# Patient Record
Sex: FEMALE | Race: WHITE
[De-identification: names, ages, dates, MRNs, and addresses within clinical notes are randomized per-mention and may not be internally consistent; named-entity substitution may affect disease eponyms.]

---

## 2018-02-10 ENCOUNTER — HOSPITAL ENCOUNTER (EMERGENCY)
Dept: HOSPITAL 8 - ED | Age: 33
Discharge: HOME | End: 2018-02-10
Payer: MEDICAID

## 2018-02-10 VITALS — SYSTOLIC BLOOD PRESSURE: 129 MMHG | DIASTOLIC BLOOD PRESSURE: 90 MMHG

## 2018-02-10 VITALS — WEIGHT: 175.93 LBS | HEIGHT: 64 IN | BODY MASS INDEX: 30.04 KG/M2
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
## 2018-02-10 DIAGNOSIS — S33.5XXA: Primary | ICD-10-CM

## 2018-02-10 DIAGNOSIS — F17.200: ICD-10-CM
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
## 2018-02-10 DIAGNOSIS — M19.90: ICD-10-CM
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
## 2018-02-10 PROCEDURE — 96372 THER/PROPH/DIAG INJ SC/IM: CPT

## 2018-02-10 PROCEDURE — 99284 EMERGENCY DEPT VISIT MOD MDM: CPT

## 2018-02-10 PROCEDURE — 72110 X-RAY EXAM L-2 SPINE 4/>VWS: CPT

## 2018-04-18 ENCOUNTER — HOSPITAL ENCOUNTER (EMERGENCY)
Dept: HOSPITAL 8 - ED | Age: 33
Discharge: HOME | End: 2018-04-18
Payer: MEDICAID

## 2018-04-18 VITALS — DIASTOLIC BLOOD PRESSURE: 85 MMHG | SYSTOLIC BLOOD PRESSURE: 132 MMHG

## 2018-04-18 VITALS — BODY MASS INDEX: 29.6 KG/M2 | WEIGHT: 173.37 LBS | HEIGHT: 64 IN

## 2018-04-18 DIAGNOSIS — M19.90: ICD-10-CM

## 2018-04-18 DIAGNOSIS — M25.571: Primary | ICD-10-CM

## 2018-04-18 PROCEDURE — 99284 EMERGENCY DEPT VISIT MOD MDM: CPT

## 2018-11-02 ENCOUNTER — HOSPITAL ENCOUNTER (EMERGENCY)
Dept: HOSPITAL 8 - ED | Age: 33
Discharge: HOME | End: 2018-11-02
Payer: MEDICAID

## 2018-11-02 VITALS — SYSTOLIC BLOOD PRESSURE: 126 MMHG | DIASTOLIC BLOOD PRESSURE: 76 MMHG

## 2018-11-02 VITALS — WEIGHT: 177.03 LBS | HEIGHT: 64 IN | BODY MASS INDEX: 30.22 KG/M2

## 2018-11-02 DIAGNOSIS — R51: Primary | ICD-10-CM

## 2018-11-02 DIAGNOSIS — R11.0: ICD-10-CM

## 2018-11-02 LAB
ALBUMIN SERPL-MCNC: 4.4 G/DL (ref 3.4–5)
ALP SERPL-CCNC: 68 U/L (ref 45–117)
ALT SERPL-CCNC: 28 U/L (ref 12–78)
ANION GAP SERPL CALC-SCNC: 8 MMOL/L (ref 5–15)
BASOPHILS # BLD AUTO: 0.02 X10^3/UL (ref 0–0.1)
BASOPHILS NFR BLD AUTO: 0 % (ref 0–1)
BILIRUB SERPL-MCNC: 0.4 MG/DL (ref 0.2–1)
CALCIUM SERPL-MCNC: 9.1 MG/DL (ref 8.5–10.1)
CHLORIDE SERPL-SCNC: 106 MMOL/L (ref 98–107)
CREAT SERPL-MCNC: 1.02 MG/DL (ref 0.55–1.02)
EOSINOPHIL # BLD AUTO: 0.12 X10^3/UL (ref 0–0.4)
EOSINOPHIL NFR BLD AUTO: 1 % (ref 1–7)
ERYTHROCYTE [DISTWIDTH] IN BLOOD BY AUTOMATED COUNT: 12.9 % (ref 9.6–15.2)
LYMPHOCYTES # BLD AUTO: 2.05 X10^3/UL (ref 1–3.4)
LYMPHOCYTES NFR BLD AUTO: 19 % (ref 22–44)
MCH RBC QN AUTO: 31.5 PG (ref 27–34.8)
MCHC RBC AUTO-ENTMCNC: 34.3 G/DL (ref 32.4–35.8)
MCV RBC AUTO: 92 FL (ref 80–100)
MD: NO
MONOCYTES # BLD AUTO: 0.55 X10^3/UL (ref 0.2–0.8)
MONOCYTES NFR BLD AUTO: 5 % (ref 2–9)
NEUTROPHILS # BLD AUTO: 7.84 X10^3/UL (ref 1.8–6.8)
NEUTROPHILS NFR BLD AUTO: 74 % (ref 42–75)
PLATELET # BLD AUTO: 304 X10^3/UL (ref 130–400)
PMV BLD AUTO: 8.5 FL (ref 7.4–10.4)
PROT SERPL-MCNC: 8.5 G/DL (ref 6.4–8.2)
RBC # BLD AUTO: 5.05 X10^6/UL (ref 3.82–5.3)

## 2018-11-02 PROCEDURE — 96374 THER/PROPH/DIAG INJ IV PUSH: CPT

## 2018-11-02 PROCEDURE — 96375 TX/PRO/DX INJ NEW DRUG ADDON: CPT

## 2018-11-02 PROCEDURE — 36415 COLL VENOUS BLD VENIPUNCTURE: CPT

## 2018-11-02 PROCEDURE — 85025 COMPLETE CBC W/AUTO DIFF WBC: CPT

## 2018-11-02 PROCEDURE — 70450 CT HEAD/BRAIN W/O DYE: CPT

## 2018-11-02 PROCEDURE — 99285 EMERGENCY DEPT VISIT HI MDM: CPT

## 2018-11-02 PROCEDURE — 80053 COMPREHEN METABOLIC PANEL: CPT

## 2019-01-22 ENCOUNTER — OFFICE VISIT (OUTPATIENT)
Dept: NEUROLOGY | Facility: MEDICAL CENTER | Age: 34
End: 2019-01-22
Payer: MEDICAID

## 2019-01-22 VITALS
HEART RATE: 77 BPM | DIASTOLIC BLOOD PRESSURE: 64 MMHG | WEIGHT: 174.4 LBS | HEIGHT: 64 IN | OXYGEN SATURATION: 96 % | RESPIRATION RATE: 16 BRPM | TEMPERATURE: 97.2 F | BODY MASS INDEX: 29.78 KG/M2 | SYSTOLIC BLOOD PRESSURE: 120 MMHG

## 2019-01-22 DIAGNOSIS — R29.818 OTHER SYMPTOMS AND SIGNS INVOLVING THE NERVOUS SYSTEM: ICD-10-CM

## 2019-01-22 DIAGNOSIS — G40.909 SEIZURE CEREBRAL (HCC): ICD-10-CM

## 2019-01-22 DIAGNOSIS — E53.8 B12 DEFICIENCY: ICD-10-CM

## 2019-01-22 DIAGNOSIS — R42 SPELL OF DIZZINESS: ICD-10-CM

## 2019-01-22 PROCEDURE — 99204 OFFICE O/P NEW MOD 45 MIN: CPT | Performed by: PSYCHIATRY & NEUROLOGY

## 2019-01-22 ASSESSMENT — PATIENT HEALTH QUESTIONNAIRE - PHQ9: CLINICAL INTERPRETATION OF PHQ2 SCORE: 0

## 2019-01-22 NOTE — PROGRESS NOTES
NEUROLOGY NOTE    Referring Physician  Marilyn Rod M.D.      CHIEF COMPLAINT:  Started having intermittent weakness and numbness over limbs since 2009  Especially when crossing legs, the leg could become weak and numb --- took minutes to hours to recover  Also pain over her limb  Chief Complaint   Patient presents with   • Establish Care     Nerve problems       PRESENT ILLNESS:   Started having weakness and numbness over limbs since 2009  Especially when crossing legs, the leg could become weak and numb --- took minutes to hours to recover  Also pain over her limb    The patient mentioned this issue to her former physicians  Knee problems were mentioned,  Blood tests,     At times, during supine position, the patient felt hot and cold feeling in the back    The patient started having anxiety since age of 16YO-- tried zoloft,   Also panic attack     PAST MEDICAL HISTORY:  No past medical history on file.    PAST SURGICAL HISTORY:  Past Surgical History:   Procedure Laterality Date   • ALIY[place] BY LAPAROSCOPY  3/11/2011    Performed by NATHAN BRAN at SURGERY [facility]       FAMILY HISTORY:  Family History   Problem Relation Age of Onset   • Alcohol/Drug Father         alcohol   • Diabetes Maternal Grandmother         NIDDM   • Heart Disease Maternal Grandmother        SOCIAL HISTORY:  Social History     Social History   • Marital status: Single     Spouse name: N/A   • Number of children: N/A   • Years of education: N/A     Occupational History   • Not on file.     Social History Main Topics   • Smoking status: Current Every Day Smoker   • Smokeless tobacco: Not on file      Comment: started 2 days ago   • Alcohol use No      Comment: before pregnancy   • Drug use: No   • Sexual activity: Yes     Partners: Male     Other Topics Concern   • Not on file     Social History Narrative   • No narrative on file     ALLERGIES:  No Known Allergies  TOBHX  History   Smoking Status   • Current Every Day Smoker  "  Smokeless Tobacco   • Not on file     Comment: started 2 days ago     ALCHX  History   Alcohol Use No     Comment: before pregnancy     DRUGHX  History   Drug Use No           MEDICATIONS:  Current Outpatient Prescriptions   Medication   • docusate sodium (COLACE) 100 MG CAPS   • ibuprofen (MOTRIN) 600 MG TABS   • oxycodone-acetaminophen (PERCOCET) 5-325 MG TABS   • Prenatal Vit w/Kf-Ckruipunh-YD (PNV PO)     No current facility-administered medications for this visit.        REVIEW OF SYSTEM:    Constitutional: Denies fevers, Denies weight changes   Eyes: Denies changes in vision, no eye pain   Ears/Nose/Throat/Mouth: Denies nasal congestion or sore throat   Cardiovascular: Denies chest pain or palpitations   Respiratory: Denies SOB.   Gastrointestinal/Hepatic: Denies abdominal pain, nausea, vomiting, diarrhea, constipation or GI bleeding   Genitourinary: Denies bladder dysfunction, dysuria or frequency   Musculoskeletal/Rheum: Denies joint pain and swelling   Skin/Breast: Denies rash, denies breast lumps or discharge   Neurological:  Limbs buckling, weakness and numbness  Psychiatric: anxiety and panic attack-- tried zoloft and celexa  Endocrine: denies hx of diabetes or thyroid dysfunction   Heme/Oncology/Lymph Nodes: Denies enlarged lymph nodes, denies brusing or known bleeding disorder   Allergic/Immunologic: Denies hx of allergies         PHYSICAL AND NEUROLOGICAL EXMAINATIONS:  VITAL SIGNS: /64 (BP Location: Right arm, Patient Position: Sitting, BP Cuff Size: Adult)   Pulse 77   Temp 36.2 °C (97.2 °F) (Temporal)   Resp 16   Ht 1.626 m (5' 4\")   Wt 79.1 kg (174 lb 6.4 oz)   SpO2 96%   BMI 29.94 kg/m²   CURRENT WEIGHT:   BMI: Body mass index is 29.94 kg/m².  PREVIOUS WEIGHTS:  Wt Readings from Last 25 Encounters:   01/22/19 79.1 kg (174 lb 6.4 oz)   03/11/11 68 kg (150 lb)   02/15/11 71 kg (156 lb 8.4 oz)   02/07/11 79.4 kg (175 lb)   08/19/10 61.7 kg (136 lb)   07/22/10 59 kg (130 lb) "       General appearance of patient: WDWN(+) NAD(+)    EYES  o Fundus : Papilledem(-) Exudates(-) Hemorrhage(-)  Nervous System  Orientation to time, place and person(+)  Memory normal(+)  Language: aphasia(-)  Knowledge: past(+) Current(+)  Attention(+)  Cranial Nerves  • Nerve 2: intact  • Nerve 3,4,6: intact  • Nerve 5 : intact  • Nerve 7: intact  • Nerve 8: intact  • Nerve 9 & 10: intact  • Nerve 11: intact  • Nerve 12: intact  Muscle Power and muscle tone: symmetric in upper and lower  Sensory System: Pin sensation intact(+)  Reflexes: symmetric throughout  Cerebellar Function FNP normal   Gait : Steady(+) TandemGait steady(+)  Heart and Vascular  Peripheral Vasucular system : Edema (-) Swelling(-)  RHB, Breathing sound clear  abdomen bowel sound normoactive  Extremities freely moveable  Joints no contracture       NEUROIMAGING:       LAB:         Her daughter has vertigo and migraine started at the age of 1YO  Sister was diagnosed as arnold chiari malformation        IMPRESSION:    1. Spells of limb tingling, weakness -- triggered by crossing legs, the weakness could last for minutes to hours since 2009-- on daily basis  2. Anxiety and Panic since age of 16YO  3. Cognitive decline since 2014    PLAN/RECOMMENDATIONS:    We will offer MRI of brain. Blood tests and EEG of brain regarding these spells of limbs tingling, weakness      ________________________________________________________________________        Regarding the spells of limb tingling -- the following are possible hypthesis    A. Migraine variants-- similar to hemiplegia migraine  B. Subtle seizures  C. TIA-- small strokes ( unlikely)  D. Psychogenic ( unlikely )    We will get MRI of Brain, EEG and blood tests    ________________________________________________________________________        Could contact us one week after these tests  We might start therapeutic trial of treatment      SIGNATURE:  Crissy De La Torre    CC:  Marilyn Rod M.D.

## 2019-01-22 NOTE — PATIENT INSTRUCTIONS
IMPRESSION:    1. Spells of limb tingling, weakness -- triggered by crossing legs, the weakness could last for minutes to hours since 2009-- on daily basis  2. Anxiety and Panic since age of 14YO  3. Cognitive decline since 2014    PLAN/RECOMMENDATIONS:    We will offer MRI of brain. Blood tests and EEG of brain regarding these spells of limbs tingling, weakness      ________________________________________________________________________        Regarding the spells of limb tingling -- the following are possible hypthesis    A. Migraine variants-- similar to hemiplegia migraine  B. Subtle seizures  C. TIA-- small strokes ( unlikely)  D. Psychogenic ( unlikely )    We will get MRI of Brain, EEG and blood tests    ________________________________________________________________________    Could contact us one week after these tests  We might start therapeutic trial of treatment        SIGNATURE:  Crissy De La Torre    CC:  Marilyn Rod M.D.

## 2019-01-31 ENCOUNTER — HOSPITAL ENCOUNTER (OUTPATIENT)
Dept: RADIOLOGY | Facility: MEDICAL CENTER | Age: 34
End: 2019-01-31
Attending: PSYCHIATRY & NEUROLOGY
Payer: MEDICAID

## 2019-01-31 DIAGNOSIS — R29.818 OTHER SYMPTOMS AND SIGNS INVOLVING THE NERVOUS SYSTEM: ICD-10-CM

## 2019-01-31 PROCEDURE — 70551 MRI BRAIN STEM W/O DYE: CPT

## 2019-02-05 ENCOUNTER — TELEPHONE (OUTPATIENT)
Dept: NEUROLOGY | Facility: MEDICAL CENTER | Age: 34
End: 2019-02-05

## 2019-07-30 ENCOUNTER — HOSPITAL ENCOUNTER (EMERGENCY)
Dept: HOSPITAL 8 - ED | Age: 34
Discharge: HOME | End: 2019-07-30
Payer: MEDICAID

## 2019-07-30 VITALS — SYSTOLIC BLOOD PRESSURE: 164 MMHG | DIASTOLIC BLOOD PRESSURE: 94 MMHG

## 2019-07-30 VITALS — WEIGHT: 158.51 LBS | BODY MASS INDEX: 28.09 KG/M2 | HEIGHT: 63 IN
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
## 2019-07-30 DIAGNOSIS — S80.862A: Primary | ICD-10-CM
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
## 2019-07-30 DIAGNOSIS — M19.90: ICD-10-CM

## 2019-07-30 PROCEDURE — 90715 TDAP VACCINE 7 YRS/> IM: CPT

## 2019-07-30 PROCEDURE — 90471 IMMUNIZATION ADMIN: CPT

## 2019-11-13 ENCOUNTER — HOSPITAL ENCOUNTER (EMERGENCY)
Dept: HOSPITAL 8 - ED | Age: 34
Discharge: HOME | End: 2019-11-13
Payer: SELF-PAY

## 2019-11-13 VITALS — WEIGHT: 157.85 LBS | BODY MASS INDEX: 27.97 KG/M2 | HEIGHT: 63 IN

## 2019-11-13 VITALS — SYSTOLIC BLOOD PRESSURE: 147 MMHG | DIASTOLIC BLOOD PRESSURE: 87 MMHG

## 2019-11-13 DIAGNOSIS — M19.90: ICD-10-CM

## 2019-11-13 DIAGNOSIS — R07.89: ICD-10-CM

## 2019-11-13 DIAGNOSIS — R50.9: ICD-10-CM

## 2019-11-13 DIAGNOSIS — J00: Primary | ICD-10-CM

## 2019-11-13 DIAGNOSIS — F17.200: ICD-10-CM

## 2019-11-13 PROCEDURE — 71046 X-RAY EXAM CHEST 2 VIEWS: CPT

## 2019-11-13 PROCEDURE — 99283 EMERGENCY DEPT VISIT LOW MDM: CPT

## 2019-11-15 ENCOUNTER — HOSPITAL ENCOUNTER (EMERGENCY)
Facility: MEDICAL CENTER | Age: 34
End: 2019-11-15
Attending: EMERGENCY MEDICINE
Payer: MEDICAID

## 2019-11-15 ENCOUNTER — APPOINTMENT (OUTPATIENT)
Dept: RADIOLOGY | Facility: MEDICAL CENTER | Age: 34
End: 2019-11-15
Attending: EMERGENCY MEDICINE
Payer: MEDICAID

## 2019-11-15 VITALS
RESPIRATION RATE: 18 BRPM | DIASTOLIC BLOOD PRESSURE: 86 MMHG | WEIGHT: 160.94 LBS | SYSTOLIC BLOOD PRESSURE: 132 MMHG | HEART RATE: 75 BPM | OXYGEN SATURATION: 97 % | HEIGHT: 64 IN | BODY MASS INDEX: 27.48 KG/M2 | TEMPERATURE: 98.3 F

## 2019-11-15 DIAGNOSIS — R05.9 COUGH: ICD-10-CM

## 2019-11-15 LAB — EKG IMPRESSION: NORMAL

## 2019-11-15 PROCEDURE — 93005 ELECTROCARDIOGRAM TRACING: CPT

## 2019-11-15 PROCEDURE — 93005 ELECTROCARDIOGRAM TRACING: CPT | Performed by: EMERGENCY MEDICINE

## 2019-11-15 PROCEDURE — 71046 X-RAY EXAM CHEST 2 VIEWS: CPT

## 2019-11-15 PROCEDURE — 99283 EMERGENCY DEPT VISIT LOW MDM: CPT

## 2019-11-15 SDOH — HEALTH STABILITY: MENTAL HEALTH: HOW OFTEN DO YOU HAVE A DRINK CONTAINING ALCOHOL?: 2-4 TIMES A MONTH

## 2019-11-15 NOTE — ED TRIAGE NOTES
"Val Vaca  Chief Complaint   Patient presents with   • Congestion   • Cough     productive x 3 days   • Shortness of Breath     Pt ambulatory to triage with above complaint.  VSS, mask placed on pt.  Pt states was seen at [facility] 2 days ago, and feels worse.  Pt also c/o \"deep\" pain (L) side of chest.    Pt taken for EKG, then returned to [facility], educated on triage process, and to inform staff of any changes or concerns.     "

## 2019-11-15 NOTE — ED PROVIDER NOTES
"ED Provider Note    Scribed for Eusebia Coelho M.D. by Elisabet Lara. 11/15/2019, 1:40 PM.    Primary care provider: Marilyn Rod M.D.  Means of arrival: Walk-in   History obtained from: Patient  History limited by: None    CHIEF COMPLAINT  Chief Complaint   Patient presents with   • Congestion   • Cough     productive x 3 days   • Shortness of Breath       HPI  Val Vaca is a 34 y.o. female who presents to the Emergency Department for evaluation of cough with green sputum onset 3 days ago. Patient states that she has shortness of breath and describes her breath sounds as \"crackling with a whistle noise.\" She reports going to the ED at [facility] for an evaluation, where she was diagnosed with atelectasis. After the visit, patient states that she has been feeling worse which prompted her to come to the ED for a second opinion. Patient reports of nasal congestion, dyspnea, emesis and pain under her left breast that started today. Patient denies any ear pain or rhinorrhea. She reports that she has been around people that are sick. Patient states that she has four children and wants to get better as soon as possible to prevent her children from getting sick.     REVIEW OF SYSTEMS  Pertinent positives include congestion, cough with green sputum, shortness of breath, dyspnea, emesis and pain under her left breast. Pertinent negatives include no ear pain or rhinorrhea.      PAST MEDICAL HISTORY   None noted.     SURGICAL HISTORY   has a past surgical history that includes [place] by laparoscopy (3/11/2011).    SOCIAL HISTORY  Social History     Tobacco Use   • Smoking status: Current Every Day Smoker     Packs/day: 0.00   • Tobacco comment: started 2 days ago   Substance Use Topics   • Alcohol use: Yes     Frequency: 2-4 times a month     Comment: before pregnancy   • Drug use: No      Social History     Substance and Sexual Activity   Drug Use No       FAMILY HISTORY  Family History   Problem Relation Age of Onset " "  • Alcohol/Drug Father         alcohol   • Diabetes Maternal Grandmother         NIDDM   • Heart Disease Maternal Grandmother      CURRENT MEDICATIONS  Home Medications     Reviewed by Italia Braga R.N. (Registered Nurse) on 11/15/19 at 1258  Med List Status: Partial   Medication Last Dose Status        Patient Mark Taking any Medications                     ALLERGIES  No Known Allergies    PHYSICAL EXAM  VITAL SIGNS: /86   Pulse 75   Temp 36.8 °C (98.3 °F) (Temporal)   Resp 18   Ht 1.626 m (5' 4\")   Wt 73 kg (160 lb 15 oz)   SpO2 97%   BMI 27.62 kg/m²   Constitutional: Alert in no apparent distress. Well apearing  HENT: Normocephalic, Atraumatic, Bilateral external ears normal. TMs normal bilaterally. Nose normal. Posterior oropharynx slightly erythematous.   Eyes:  Conjunctiva normal, non-icteric.   Lungs: Lungs are clear. Non-labored respirations  Skin: Warm, Dry, No erythema, No rash.   Neurologic: Alert, Grossly non-focal.   Psychiatric: Affect normal, Judgment normal, Mood normal, Appears appropriate and not intoxicated.     EKG  12 Lead EKG interpreted by me to show:  Normal sinus rhythm  Rate 66  Axis: Normal  Intervals: Normal  Normal T waves  Normal ST segments  My impression of this EKG: Does not indicate ischemia or arrythmia at this time.    RADIOLOGY  DX-CHEST-2 VIEWS   Final Result         1. No active cardiopulmonary abnormalities are identified.        The radiologist's interpretation of all radiological studies have been reviewed by me.    COURSE & MEDICAL DECISION MAKING  Pertinent Labs & Imaging studies reviewed. (See chart for details)    1:40 PM - Patient seen and examined at bedside. Ordered DX-chest to evaluate her symptoms.     2:20 PM - Patient was reevaluated at bedside. Discussed radiology results with the patient and informed them that the results came back normal.  I think her symptoms are likely viral in nature and will clear on their own.  X-ray was done once " again that does not show any evidence of pneumonia.  I informed the patient that it would take some time for her symptoms to improve, and the best thing to do at this time would be to rest.        The patient will return for new or worsening symptoms and is stable at the time of discharge. Patient was given return precautions. Patient and/or family member verbalizes understanding and will comply.    DISPOSITION:  Patient will be discharged home in stable condition.    FOLLOW UP:  Marilyn Rod M.D.  [address] [facility] [address] [phone]      As needed    [facility], Emergency Dept  [address] [facility] [address] [phone]    If symptoms worsen difficulty breathing or other concerns    FINAL IMPRESSION  1. Cough         This dictation has been created using voice recognition software and/or scribes. The accuracy of the dictation is limited by the abilities of the software and the expertise of the scribes. I expect there may be some errors of grammar and possibly content. I made every attempt to manually correct the errors within my dictation. However, errors related to voice recognition software and/or scribes may still exist and should be interpreted within the appropriate context.     I, Elisabet Lara (Scribe), am scribing for, and in the presence of, Eusebia Coelho M.D..    Electronically signed by: Elisabet Lara (Scribe), 11/15/2019    Eusebia HOOKER M.D. personally performed the services described in this documentation, as scribed by Elisabet Lara in my presence, and it is both accurate and complete.E.    The note accurately reflects work and decisions made by me.  Eusebia Coelho  11/15/2019  4:54 PM

## 2019-11-15 NOTE — ED NOTES
Pt discharge to home.  Pt provided with discharge instruction.  Pt verbalized understanding, all questions answered.  VSS upon DC. Pt steady on feet upon DC.

## 2020-12-03 ENCOUNTER — HOSPITAL ENCOUNTER (OUTPATIENT)
Dept: RADIOLOGY | Facility: MEDICAL CENTER | Age: 35
End: 2020-12-03
Attending: FAMILY MEDICINE
Payer: MEDICAID

## 2020-12-03 DIAGNOSIS — R10.31 RLQ ABDOMINAL PAIN: ICD-10-CM

## 2021-03-14 ENCOUNTER — HOSPITAL ENCOUNTER (EMERGENCY)
Dept: HOSPITAL 8 - ED | Age: 36
Discharge: HOME | End: 2021-03-14
Payer: MEDICAID

## 2021-03-14 VITALS — BODY MASS INDEX: 33.67 KG/M2 | HEIGHT: 63 IN | WEIGHT: 190.04 LBS

## 2021-03-14 VITALS — DIASTOLIC BLOOD PRESSURE: 70 MMHG | SYSTOLIC BLOOD PRESSURE: 120 MMHG

## 2021-03-14 DIAGNOSIS — H00.015: Primary | ICD-10-CM

## 2021-03-14 PROCEDURE — 99283 EMERGENCY DEPT VISIT LOW MDM: CPT

## 2021-03-14 NOTE — NUR
pt here for redness/swelling to lower eyelid and irritation to L eye after 
wearing fibrous mascara and scratching her eye.  a couple of days ago.  mild 
irritation.  no loss or change of vision

## 2022-08-29 ENCOUNTER — PRE-ADMISSION TESTING (OUTPATIENT)
Dept: ADMISSIONS | Facility: MEDICAL CENTER | Age: 37
End: 2022-08-29
Attending: SURGERY
Payer: COMMERCIAL

## 2022-08-29 RX ORDER — METHOCARBAMOL 750 MG/1
750 TABLET, FILM COATED ORAL 2 TIMES DAILY WITH MEALS
COMMUNITY

## 2022-08-29 RX ORDER — ALBUTEROL SULFATE 90 UG/1
2 AEROSOL, METERED RESPIRATORY (INHALATION) EVERY 6 HOURS PRN
Status: ON HOLD | COMMUNITY
End: 2023-04-27

## 2022-08-29 RX ORDER — MELOXICAM 7.5 MG/1
7.5 TABLET ORAL 2 TIMES DAILY
COMMUNITY

## 2022-08-29 RX ORDER — GABAPENTIN 300 MG/1
300 CAPSULE ORAL 3 TIMES DAILY
COMMUNITY
End: 2023-04-24

## 2022-08-29 NOTE — PREPROCEDURE INSTRUCTIONS
Per anesthesia protocol instructed to take the following medications DOS: ALBUTEROL INHALER, GABAPENTIN, ROBAXIN

## 2022-08-31 ENCOUNTER — ANESTHESIA (OUTPATIENT)
Dept: SURGERY | Facility: MEDICAL CENTER | Age: 37
End: 2022-08-31
Payer: COMMERCIAL

## 2022-08-31 ENCOUNTER — HOSPITAL ENCOUNTER (OUTPATIENT)
Facility: MEDICAL CENTER | Age: 37
End: 2022-08-31
Attending: SURGERY | Admitting: SURGERY
Payer: COMMERCIAL

## 2022-08-31 ENCOUNTER — ANESTHESIA EVENT (OUTPATIENT)
Dept: SURGERY | Facility: MEDICAL CENTER | Age: 37
End: 2022-08-31
Payer: COMMERCIAL

## 2022-08-31 VITALS
DIASTOLIC BLOOD PRESSURE: 81 MMHG | SYSTOLIC BLOOD PRESSURE: 128 MMHG | RESPIRATION RATE: 16 BRPM | WEIGHT: 182.32 LBS | OXYGEN SATURATION: 96 % | BODY MASS INDEX: 32.3 KG/M2 | HEIGHT: 63 IN | HEART RATE: 51 BPM | TEMPERATURE: 97 F

## 2022-08-31 DIAGNOSIS — G89.18 POST-OPERATIVE PAIN: ICD-10-CM

## 2022-08-31 LAB — HCG SERPL QL: NEGATIVE

## 2022-08-31 PROCEDURE — 700111 HCHG RX REV CODE 636 W/ 250 OVERRIDE (IP): Performed by: ANESTHESIOLOGY

## 2022-08-31 PROCEDURE — 160046 HCHG PACU - 1ST 60 MINS PHASE II: Performed by: SURGERY

## 2022-08-31 PROCEDURE — 160009 HCHG ANES TIME/MIN: Performed by: SURGERY

## 2022-08-31 PROCEDURE — 700101 HCHG RX REV CODE 250: Performed by: ANESTHESIOLOGY

## 2022-08-31 PROCEDURE — 700102 HCHG RX REV CODE 250 W/ 637 OVERRIDE(OP): Performed by: ANESTHESIOLOGY

## 2022-08-31 PROCEDURE — 160048 HCHG OR STATISTICAL LEVEL 1-5: Performed by: SURGERY

## 2022-08-31 PROCEDURE — 84703 CHORIONIC GONADOTROPIN ASSAY: CPT

## 2022-08-31 PROCEDURE — 160031 HCHG SURGERY MINUTES - 1ST 30 MINS LEVEL 5: Performed by: SURGERY

## 2022-08-31 PROCEDURE — A9270 NON-COVERED ITEM OR SERVICE: HCPCS | Performed by: ANESTHESIOLOGY

## 2022-08-31 PROCEDURE — 160042 HCHG SURGERY MINUTES - EA ADDL 1 MIN LEVEL 5: Performed by: SURGERY

## 2022-08-31 PROCEDURE — 160025 RECOVERY II MINUTES (STATS): Performed by: SURGERY

## 2022-08-31 PROCEDURE — 36415 COLL VENOUS BLD VENIPUNCTURE: CPT

## 2022-08-31 PROCEDURE — 00840 ANES IPER PX LOWER ABD NOS: CPT | Performed by: ANESTHESIOLOGY

## 2022-08-31 PROCEDURE — 160002 HCHG RECOVERY MINUTES (STAT): Performed by: SURGERY

## 2022-08-31 PROCEDURE — 700101 HCHG RX REV CODE 250: Performed by: SURGERY

## 2022-08-31 PROCEDURE — 700105 HCHG RX REV CODE 258: Performed by: SURGERY

## 2022-08-31 PROCEDURE — 160035 HCHG PACU - 1ST 60 MINS PHASE I: Performed by: SURGERY

## 2022-08-31 PROCEDURE — 502714 HCHG ROBOTIC SURGERY SERVICES: Performed by: SURGERY

## 2022-08-31 PROCEDURE — C1781 MESH (IMPLANTABLE): HCPCS | Performed by: SURGERY

## 2022-08-31 PROCEDURE — 160036 HCHG PACU - EA ADDL 30 MINS PHASE I: Performed by: SURGERY

## 2022-08-31 DEVICE — MESH PROGRIP LAPROSCOPIC SELF FIXATING (1/CA): Type: IMPLANTABLE DEVICE | Site: UMBILICAL | Status: FUNCTIONAL

## 2022-08-31 RX ORDER — BUPIVACAINE HYDROCHLORIDE AND EPINEPHRINE 5; 5 MG/ML; UG/ML
INJECTION, SOLUTION EPIDURAL; INTRACAUDAL; PERINEURAL
Status: DISCONTINUED | OUTPATIENT
Start: 2022-08-31 | End: 2022-08-31 | Stop reason: HOSPADM

## 2022-08-31 RX ORDER — SODIUM CHLORIDE, SODIUM LACTATE, POTASSIUM CHLORIDE, CALCIUM CHLORIDE 600; 310; 30; 20 MG/100ML; MG/100ML; MG/100ML; MG/100ML
INJECTION, SOLUTION INTRAVENOUS CONTINUOUS
Status: ACTIVE | OUTPATIENT
Start: 2022-08-31 | End: 2022-08-31

## 2022-08-31 RX ORDER — SODIUM CHLORIDE, SODIUM LACTATE, POTASSIUM CHLORIDE, CALCIUM CHLORIDE 600; 310; 30; 20 MG/100ML; MG/100ML; MG/100ML; MG/100ML
INJECTION, SOLUTION INTRAVENOUS CONTINUOUS
Status: DISCONTINUED | OUTPATIENT
Start: 2022-08-31 | End: 2022-08-31 | Stop reason: HOSPADM

## 2022-08-31 RX ORDER — HALOPERIDOL 5 MG/ML
1 INJECTION INTRAMUSCULAR
Status: DISCONTINUED | OUTPATIENT
Start: 2022-08-31 | End: 2022-08-31 | Stop reason: HOSPADM

## 2022-08-31 RX ORDER — HYDROMORPHONE HYDROCHLORIDE 1 MG/ML
0.1 INJECTION, SOLUTION INTRAMUSCULAR; INTRAVENOUS; SUBCUTANEOUS
Status: DISCONTINUED | OUTPATIENT
Start: 2022-08-31 | End: 2022-08-31 | Stop reason: HOSPADM

## 2022-08-31 RX ORDER — HYDROMORPHONE HYDROCHLORIDE 1 MG/ML
0.4 INJECTION, SOLUTION INTRAMUSCULAR; INTRAVENOUS; SUBCUTANEOUS
Status: DISCONTINUED | OUTPATIENT
Start: 2022-08-31 | End: 2022-08-31 | Stop reason: HOSPADM

## 2022-08-31 RX ORDER — ONDANSETRON 2 MG/ML
4 INJECTION INTRAMUSCULAR; INTRAVENOUS
Status: COMPLETED | OUTPATIENT
Start: 2022-08-31 | End: 2022-08-31

## 2022-08-31 RX ORDER — ACETAMINOPHEN 500 MG
1000 TABLET ORAL ONCE
Status: COMPLETED | OUTPATIENT
Start: 2022-08-31 | End: 2022-08-31

## 2022-08-31 RX ORDER — ONDANSETRON 2 MG/ML
INJECTION INTRAMUSCULAR; INTRAVENOUS PRN
Status: DISCONTINUED | OUTPATIENT
Start: 2022-08-31 | End: 2022-08-31 | Stop reason: SURG

## 2022-08-31 RX ORDER — HYDROMORPHONE HYDROCHLORIDE 1 MG/ML
0.2 INJECTION, SOLUTION INTRAMUSCULAR; INTRAVENOUS; SUBCUTANEOUS
Status: DISCONTINUED | OUTPATIENT
Start: 2022-08-31 | End: 2022-08-31 | Stop reason: HOSPADM

## 2022-08-31 RX ORDER — OXYCODONE HCL 5 MG/5 ML
5 SOLUTION, ORAL ORAL
Status: COMPLETED | OUTPATIENT
Start: 2022-08-31 | End: 2022-08-31

## 2022-08-31 RX ORDER — MIDAZOLAM HYDROCHLORIDE 1 MG/ML
INJECTION INTRAMUSCULAR; INTRAVENOUS PRN
Status: DISCONTINUED | OUTPATIENT
Start: 2022-08-31 | End: 2022-08-31 | Stop reason: SURG

## 2022-08-31 RX ORDER — OXYCODONE HCL 5 MG/5 ML
10 SOLUTION, ORAL ORAL
Status: COMPLETED | OUTPATIENT
Start: 2022-08-31 | End: 2022-08-31

## 2022-08-31 RX ORDER — HYDROCODONE BITARTRATE AND ACETAMINOPHEN 5; 325 MG/1; MG/1
1 TABLET ORAL EVERY 4 HOURS PRN
Qty: 20 TABLET | Refills: 0 | Status: SHIPPED | OUTPATIENT
Start: 2022-08-31 | End: 2022-09-07

## 2022-08-31 RX ORDER — DIPHENHYDRAMINE HYDROCHLORIDE 50 MG/ML
12.5 INJECTION INTRAMUSCULAR; INTRAVENOUS
Status: DISCONTINUED | OUTPATIENT
Start: 2022-08-31 | End: 2022-08-31 | Stop reason: HOSPADM

## 2022-08-31 RX ORDER — LIDOCAINE HYDROCHLORIDE 20 MG/ML
INJECTION, SOLUTION EPIDURAL; INFILTRATION; INTRACAUDAL; PERINEURAL PRN
Status: DISCONTINUED | OUTPATIENT
Start: 2022-08-31 | End: 2022-08-31 | Stop reason: SURG

## 2022-08-31 RX ORDER — DEXAMETHASONE SODIUM PHOSPHATE 4 MG/ML
INJECTION, SOLUTION INTRA-ARTICULAR; INTRALESIONAL; INTRAMUSCULAR; INTRAVENOUS; SOFT TISSUE PRN
Status: DISCONTINUED | OUTPATIENT
Start: 2022-08-31 | End: 2022-08-31 | Stop reason: SURG

## 2022-08-31 RX ORDER — CEFAZOLIN SODIUM 1 G/3ML
INJECTION, POWDER, FOR SOLUTION INTRAMUSCULAR; INTRAVENOUS PRN
Status: DISCONTINUED | OUTPATIENT
Start: 2022-08-31 | End: 2022-08-31 | Stop reason: SURG

## 2022-08-31 RX ORDER — ROCURONIUM BROMIDE 10 MG/ML
INJECTION, SOLUTION INTRAVENOUS PRN
Status: DISCONTINUED | OUTPATIENT
Start: 2022-08-31 | End: 2022-08-31 | Stop reason: SURG

## 2022-08-31 RX ADMIN — CEFAZOLIN 2 G: 330 INJECTION, POWDER, FOR SOLUTION INTRAMUSCULAR; INTRAVENOUS at 09:10

## 2022-08-31 RX ADMIN — ONDANSETRON 4 MG: 2 INJECTION INTRAMUSCULAR; INTRAVENOUS at 09:29

## 2022-08-31 RX ADMIN — FENTANYL CITRATE 100 MCG: 50 INJECTION, SOLUTION INTRAMUSCULAR; INTRAVENOUS at 09:29

## 2022-08-31 RX ADMIN — HALOPERIDOL LACTATE 1 MG: 5 INJECTION, SOLUTION INTRAMUSCULAR at 12:02

## 2022-08-31 RX ADMIN — SUGAMMADEX 200 MG: 100 INJECTION, SOLUTION INTRAVENOUS at 10:13

## 2022-08-31 RX ADMIN — FENTANYL CITRATE 50 MCG: 50 INJECTION, SOLUTION INTRAMUSCULAR; INTRAVENOUS at 10:42

## 2022-08-31 RX ADMIN — SODIUM CHLORIDE, POTASSIUM CHLORIDE, SODIUM LACTATE AND CALCIUM CHLORIDE: 600; 310; 30; 20 INJECTION, SOLUTION INTRAVENOUS at 08:07

## 2022-08-31 RX ADMIN — OXYCODONE HYDROCHLORIDE 10 MG: 5 SOLUTION ORAL at 10:41

## 2022-08-31 RX ADMIN — MIDAZOLAM HYDROCHLORIDE 2 MG: 1 INJECTION, SOLUTION INTRAMUSCULAR; INTRAVENOUS at 09:29

## 2022-08-31 RX ADMIN — FENTANYL CITRATE 100 MCG: 50 INJECTION, SOLUTION INTRAMUSCULAR; INTRAVENOUS at 10:00

## 2022-08-31 RX ADMIN — ROCURONIUM BROMIDE 40 MG: 10 INJECTION, SOLUTION INTRAVENOUS at 09:29

## 2022-08-31 RX ADMIN — PROPOFOL 150 MG: 10 INJECTION, EMULSION INTRAVENOUS at 09:29

## 2022-08-31 RX ADMIN — FENTANYL CITRATE 50 MCG: 50 INJECTION, SOLUTION INTRAMUSCULAR; INTRAVENOUS at 10:50

## 2022-08-31 RX ADMIN — DEXAMETHASONE SODIUM PHOSPHATE 4 MG: 4 INJECTION, SOLUTION INTRA-ARTICULAR; INTRALESIONAL; INTRAMUSCULAR; INTRAVENOUS; SOFT TISSUE at 09:29

## 2022-08-31 RX ADMIN — LIDOCAINE HYDROCHLORIDE 100 MG: 20 INJECTION, SOLUTION EPIDURAL; INFILTRATION; INTRACAUDAL at 09:29

## 2022-08-31 RX ADMIN — ONDANSETRON 4 MG: 2 INJECTION INTRAMUSCULAR; INTRAVENOUS at 11:38

## 2022-08-31 RX ADMIN — FENTANYL CITRATE 50 MCG: 50 INJECTION, SOLUTION INTRAMUSCULAR; INTRAVENOUS at 11:01

## 2022-08-31 RX ADMIN — ACETAMINOPHEN 1000 MG: 500 TABLET ORAL at 07:34

## 2022-08-31 ASSESSMENT — PAIN SCALES - GENERAL: PAIN_LEVEL: 3

## 2022-08-31 NOTE — OP REPORT
OP Note    PreOp Diagnosis: Incarcerated ventral incisional hernia    PostOp Diagnosis: Incarcerated supraumbilical ventral incisional hernia    Procedure(s):  1.  Robotic assisted laparoscopic repair of ventral incisional hernia  2.  Implantation of mesh for ventral hernia repair    Surgeon(s):  Salo Nevarez MD    Assistant(s):  Tamika Andres MD    Anesthesiologist/Type of Anesthesia:  Anesthesiologist: Roberth Long M.D./General    Surgical Staff:  Circulator: Heather C Cogan, R.N.  Scrub Person: Radha Blankenship    Specimens removed if any:  None    Estimated Blood Loss: 10 cc    Findings: Incarcerated ventral supraumbilical incisional hernia    Complications: None observed    Description:  The patient was met in the preoperative holding area where all of the potential risks and benefits of the procedure were discussed in detail with the patient. All of her questions were answered to her satisfaction and informed consent was signed. The patient was taken back to the operating room and placed supine on the operating room table. General endotracheal anesthesia was induced and all of the patients pressure points were padded appropriately. The patients abdomen was prepped and draped in the usual sterile fashion.  A timeout was performed which correctly identified the patient and the procedure being performed and preoperative antibiotics were given according to SCIP guidelines.     The procedure was started with infiltration of 0.5% marcaine into the left upper quadrant skin. An 8mm incision was made and carried down to fascia. The verress needle was inserted into the abdominal cavity and insufflation carried out to 15mmHg.  An 8mm robotic trochar was inserted into the abdominal cavity. The camera was inserted into the abdominal cavity and a brief survey of the abdominal cavity demonstrated no obvious injuries or pathologies other than u incarcerated preperitoneal fat within a supraumbilical ventral incisional hernia.  Two additional 8mm robotic trochars were then placed along the left abdominal wall after the skin and peritoneum were infiltrated with 0.5% marcaine with epinephrine. The robot was then docked and I took my position at the console.    I created a preperitoneal flap using a combination of electrocautery and sharp dissection. I dissected to the hernia which I reduced under direct visualization. It was fat-containing. I created a large preperitoneal space with care not to make defects in the peritoneum. I thne used a 15 cm Stratisfix suture to reapproximate the hernia defect and used a measuring device to measure the preperitoneal space. I used a 10 x 15 cm ProGrip mesh which I fashioned a fit the preperitoneal space at 10cm x 8cm. This had excellent coverage of the defect and laid flat against the abdominal wall. I closed the the peritoneal flap with a 2-0 Stratisfix suture. I used vicryl to close small rents in the peritoneum made during dissection.  This completely had the mesh from the abdominal cavity. Hemostasis was meticulously achieved. The needles were withdrawn under direct visualization.  The robot was then undocked.  Monocryl suture was used to re-approximate the skin edges. Dermabond was applied as a dressing.     The patient tolerated the procedure well and was extubated at the conclusion of the case without incident. All of the sponge, needle and instrument counts were correct at the conclusion of the case. After she was awoken from anesthesia she was taken to the recovery room in stable condition.         8/31/2022 10:09 AM Salo Nevarez M.D.

## 2022-08-31 NOTE — OR NURSING
1021: To PACU post robot assisted ventral hernia repair. Pt is extubated, breathing is spontaneous and unlabored.    1041: Medicated for pain of 7/10.    1100: Pain remains elevated at 7/10.    1120: Pt sleeping, awakens easily. Pain is tolerable, no nausea.    1133: Meets criteria for stage ll.

## 2022-08-31 NOTE — DISCHARGE INSTRUCTIONS
What to Expect Post Anesthesia    Rest and take it easy for the first 24 hours.  A responsible adult is recommended to remain with you during that time.  It is normal to feel sleepy.  We encourage you to not do anything that requires balance, judgment or coordination.    FOR 24 HOURS DO NOT:  Drive, operate machinery or run household appliances.  Drink beer or alcoholic beverages.  Make important decisions or sign legal documents.    To avoid nausea, slowly advance diet as tolerated, avoiding spicy or greasy foods for the first day.  Add more substantial food to your diet according to your provider's instructions.  Babies can be fed formula or breast milk as soon as they are hungry.  INCREASE FLUIDS AND FIBER TO AVOID CONSTIPATION.    MILD FLU-LIKE SYMPTOMS ARE NORMAL.  YOU MAY EXPERIENCE GENERALIZED MUSCLE ACHES, THROAT IRRITATION, HEADACHE AND/OR SOME NAUSEA.    If any questions arise, call your provider.  If your provider is not available, please feel free to call the Surgical Center at [phone].    MEDICATIONS: Resume taking daily medication.  Take prescribed pain medication with food.  If no medication is prescribed, you may take non-aspirin pain medication if needed.  PAIN MEDICATION CAN BE VERY CONSTIPATING.  Take a stool softener or laxative such as senokot, pericolace, or milk of magnesia if needed.    Last pain medication (Oxycodone 10 mg) given at 10:40 am    No heavy lifting, pulling or pushing (<15lbs) for 6 weeks  Ok to shower, do not bath or swim for 2 weeks    Dr. Nevarez [phone]

## 2022-08-31 NOTE — ANESTHESIA PROCEDURE NOTES
Airway    Date/Time: 8/31/2022 9:14 AM  Performed by: Roberth Long M.D.  Authorized by: Roberth Long M.D.     Location:  OR  Urgency:  Elective  Indications for Airway Management:  Anesthesia      Spontaneous Ventilation: absent    Sedation Level:  Deep  Preoxygenated: Yes    Patient Position:  Sniffing  Final Airway Type:  Endotracheal airway  Final Endotracheal Airway:  ETT  Cuffed: Yes    Technique Used for Successful ETT Placement:  Direct laryngoscopy    Insertion Site:  Oral  Blade Type:  Reed  Laryngoscope Blade/Videolaryngoscope Blade Size:  2  ETT Size (mm):  7.0  Measured from:  Teeth  ETT to Teeth (cm):  22  Placement Verified by: auscultation and capnometry    Cormack-Lehane Classification:  Grade I - full view of glottis  Number of Attempts at Approach:  1

## 2022-08-31 NOTE — ANESTHESIA POSTPROCEDURE EVALUATION
Patient: Val Vaca    Procedure Summary     Date: 08/31/22 Room / Location:  OR  / SURGERY [facility] ER    Anesthesia Start: 0910 Anesthesia Stop: 1026    Procedure: ROBOTIC UMBILICAL AND VENTRAL INCISIONAL HERNIA REPAIR WITH MESH (Abdomen) Diagnosis: (UMBILICAL HERNIA)    Surgeons: Salo Nevarez M.D. Responsible Provider: Roberth Long M.D.    Anesthesia Type: general ASA Status: 2          Final Anesthesia Type: general  Last vitals  BP   Blood Pressure: 118/73    Temp   36.2 °C (97.2 °F)    Pulse   65   Resp   18    SpO2   95 %      Anesthesia Post Evaluation    Patient location during evaluation: PACU  Patient participation: complete - patient participated  Level of consciousness: awake and alert  Pain score: 3    Airway patency: patent  Anesthetic complications: no  Cardiovascular status: hemodynamically stable  Respiratory status: acceptable  Hydration status: euvolemic    PONV: none          There were no known notable events for this encounter.     Nurse Pain Score: 7 (NPRS)

## 2022-08-31 NOTE — ANESTHESIA PREPROCEDURE EVALUATION
Case: [id number] Date/Time: 08/31/22 0845    Procedures:       ROBOTIC UMBILICAL AND VENTRAL INCISIONAL HERNIA REPAIR WITH MESH      REPAIR, HERNIA, VENTRAL, ROBOT-ASSISTED, USING DA LUDMILA XI    Pre-op diagnosis: UMBILICAL HERNIA    Location: [facility] OR 01 / SURGERY [facility]    Surgeons: Salo Nevarez M.D.          Relevant Problems   NEURO   (positive) Seizure cerebral (HCC)       Physical Exam    Airway   Mallampati: II  TM distance: >3 FB  Neck ROM: full       Cardiovascular - normal exam  Rhythm: regular  Rate: normal  (-) murmur     Dental - normal exam           Pulmonary - normal exam  Breath sounds clear to auscultation     Abdominal    Neurological - normal exam                 Anesthesia Plan    ASA 2       Plan - general       Airway plan will be ETT          Induction: intravenous    Postoperative Plan: Postoperative administration of opioids is intended.    Pertinent diagnostic labs and testing reviewed    Informed Consent:    Anesthetic plan and risks discussed with patient.    Use of blood products discussed with: patient whom consented to blood products.

## 2023-04-24 ENCOUNTER — PRE-ADMISSION TESTING (OUTPATIENT)
Dept: ADMISSIONS | Facility: MEDICAL CENTER | Age: 38
End: 2023-04-24
Attending: ORTHOPAEDIC SURGERY
Payer: COMMERCIAL

## 2023-04-24 DIAGNOSIS — Z01.811 PRE-OPERATIVE RESPIRATORY EXAMINATION: ICD-10-CM

## 2023-04-24 DIAGNOSIS — Z01.810 PRE-OPERATIVE CARDIOVASCULAR EXAMINATION: ICD-10-CM

## 2023-04-24 DIAGNOSIS — Z01.812 PRE-OPERATIVE LABORATORY EXAMINATION: ICD-10-CM

## 2023-04-24 NOTE — OR NURSING
Attempted RN preadmit tele appointment 3 times.  I got patient's voicemail with each call and left a message for her to call back and re-schedule her preadmit appointment.

## 2023-04-25 ENCOUNTER — PRE-ADMISSION TESTING (OUTPATIENT)
Dept: ADMISSIONS | Facility: MEDICAL CENTER | Age: 38
End: 2023-04-25
Attending: ORTHOPAEDIC SURGERY
Payer: COMMERCIAL

## 2023-04-25 ENCOUNTER — HOSPITAL ENCOUNTER (OUTPATIENT)
Dept: RADIOLOGY | Facility: MEDICAL CENTER | Age: 38
End: 2023-04-25
Attending: ORTHOPAEDIC SURGERY | Admitting: ORTHOPAEDIC SURGERY
Payer: COMMERCIAL

## 2023-04-25 DIAGNOSIS — Z01.810 PRE-OPERATIVE CARDIOVASCULAR EXAMINATION: ICD-10-CM

## 2023-04-25 DIAGNOSIS — Z01.812 PRE-OPERATIVE LABORATORY EXAMINATION: ICD-10-CM

## 2023-04-25 DIAGNOSIS — Z01.811 PRE-OPERATIVE RESPIRATORY EXAMINATION: ICD-10-CM

## 2023-04-25 LAB
ANION GAP SERPL CALC-SCNC: 10 MMOL/L (ref 7–16)
APTT PPP: 29.2 SEC (ref 24.7–36)
BASOPHILS # BLD AUTO: 0.7 % (ref 0–1.8)
BASOPHILS # BLD: 0.05 K/UL (ref 0–0.12)
BUN SERPL-MCNC: 13 MG/DL (ref 8–22)
CALCIUM SERPL-MCNC: 9.2 MG/DL (ref 8.5–10.5)
CHLORIDE SERPL-SCNC: 102 MMOL/L (ref 96–112)
CO2 SERPL-SCNC: 24 MMOL/L (ref 20–33)
CREAT SERPL-MCNC: 0.75 MG/DL (ref 0.5–1.4)
EKG IMPRESSION: NORMAL
EOSINOPHIL # BLD AUTO: 0.15 K/UL (ref 0–0.51)
EOSINOPHIL NFR BLD: 2.1 % (ref 0–6.9)
ERYTHROCYTE [DISTWIDTH] IN BLOOD BY AUTOMATED COUNT: 43.8 FL (ref 35.9–50)
GFR SERPLBLD CREATININE-BSD FMLA CKD-EPI: 103 ML/MIN/1.73 M 2
GLUCOSE SERPL-MCNC: 92 MG/DL (ref 65–99)
HCG SERPL QL: NEGATIVE
HCT VFR BLD AUTO: 44.9 % (ref 37–47)
HGB BLD-MCNC: 14.9 G/DL (ref 12–16)
IMM GRANULOCYTES # BLD AUTO: 0.03 K/UL (ref 0–0.11)
IMM GRANULOCYTES NFR BLD AUTO: 0.4 % (ref 0–0.9)
INR PPP: 0.96 (ref 0.87–1.13)
LYMPHOCYTES # BLD AUTO: 1.85 K/UL (ref 1–4.8)
LYMPHOCYTES NFR BLD: 26.4 % (ref 22–41)
MCH RBC QN AUTO: 30.3 PG (ref 27–33)
MCHC RBC AUTO-ENTMCNC: 33.2 G/DL (ref 33.6–35)
MCV RBC AUTO: 91.4 FL (ref 81.4–97.8)
MONOCYTES # BLD AUTO: 0.35 K/UL (ref 0–0.85)
MONOCYTES NFR BLD AUTO: 5 % (ref 0–13.4)
NEUTROPHILS # BLD AUTO: 4.57 K/UL (ref 2–7.15)
NEUTROPHILS NFR BLD: 65.4 % (ref 44–72)
NRBC # BLD AUTO: 0 K/UL
NRBC BLD-RTO: 0 /100 WBC
PLATELET # BLD AUTO: 275 K/UL (ref 164–446)
PMV BLD AUTO: 10.2 FL (ref 9–12.9)
POTASSIUM SERPL-SCNC: 4.2 MMOL/L (ref 3.6–5.5)
PROTHROMBIN TIME: 12.7 SEC (ref 12–14.6)
RBC # BLD AUTO: 4.91 M/UL (ref 4.2–5.4)
SODIUM SERPL-SCNC: 136 MMOL/L (ref 135–145)
WBC # BLD AUTO: 7 K/UL (ref 4.8–10.8)

## 2023-04-25 PROCEDURE — 71046 X-RAY EXAM CHEST 2 VIEWS: CPT

## 2023-04-25 PROCEDURE — 85652 RBC SED RATE AUTOMATED: CPT

## 2023-04-25 PROCEDURE — 85730 THROMBOPLASTIN TIME PARTIAL: CPT

## 2023-04-25 PROCEDURE — 83036 HEMOGLOBIN GLYCOSYLATED A1C: CPT

## 2023-04-25 PROCEDURE — 80048 BASIC METABOLIC PNL TOTAL CA: CPT

## 2023-04-25 PROCEDURE — 93010 ELECTROCARDIOGRAM REPORT: CPT | Performed by: INTERNAL MEDICINE

## 2023-04-25 PROCEDURE — 36415 COLL VENOUS BLD VENIPUNCTURE: CPT

## 2023-04-25 PROCEDURE — 93005 ELECTROCARDIOGRAM TRACING: CPT

## 2023-04-25 PROCEDURE — 85610 PROTHROMBIN TIME: CPT

## 2023-04-25 PROCEDURE — 85025 COMPLETE CBC W/AUTO DIFF WBC: CPT

## 2023-04-25 PROCEDURE — 84703 CHORIONIC GONADOTROPIN ASSAY: CPT

## 2023-04-26 ENCOUNTER — ANESTHESIA EVENT (OUTPATIENT)
Dept: SURGERY | Facility: MEDICAL CENTER | Age: 38
End: 2023-04-26
Payer: COMMERCIAL

## 2023-04-26 LAB
ERYTHROCYTE [SEDIMENTATION RATE] IN BLOOD BY WESTERGREN METHOD: 2 MM/HOUR (ref 0–25)
EST. AVERAGE GLUCOSE BLD GHB EST-MCNC: 120 MG/DL
HBA1C MFR BLD: 5.8 % (ref 4–5.6)

## 2023-04-27 ENCOUNTER — HOSPITAL ENCOUNTER (OUTPATIENT)
Facility: MEDICAL CENTER | Age: 38
End: 2023-04-27
Attending: ORTHOPAEDIC SURGERY | Admitting: ORTHOPAEDIC SURGERY
Payer: COMMERCIAL

## 2023-04-27 ENCOUNTER — ANESTHESIA (OUTPATIENT)
Dept: SURGERY | Facility: MEDICAL CENTER | Age: 38
End: 2023-04-27
Payer: COMMERCIAL

## 2023-04-27 VITALS
TEMPERATURE: 96.6 F | DIASTOLIC BLOOD PRESSURE: 76 MMHG | HEIGHT: 63 IN | HEART RATE: 55 BPM | RESPIRATION RATE: 18 BRPM | BODY MASS INDEX: 30.27 KG/M2 | WEIGHT: 170.86 LBS | OXYGEN SATURATION: 99 % | SYSTOLIC BLOOD PRESSURE: 130 MMHG

## 2023-04-27 DIAGNOSIS — G89.18 POSTOPERATIVE PAIN: ICD-10-CM

## 2023-04-27 PROCEDURE — 160002 HCHG RECOVERY MINUTES (STAT): Performed by: ORTHOPAEDIC SURGERY

## 2023-04-27 PROCEDURE — 160009 HCHG ANES TIME/MIN: Performed by: ORTHOPAEDIC SURGERY

## 2023-04-27 PROCEDURE — 160028 HCHG SURGERY MINUTES - 1ST 30 MINS LEVEL 3: Performed by: ORTHOPAEDIC SURGERY

## 2023-04-27 PROCEDURE — 700105 HCHG RX REV CODE 258: Mod: UD | Performed by: ORTHOPAEDIC SURGERY

## 2023-04-27 PROCEDURE — 700111 HCHG RX REV CODE 636 W/ 250 OVERRIDE (IP): Mod: UD | Performed by: STUDENT IN AN ORGANIZED HEALTH CARE EDUCATION/TRAINING PROGRAM

## 2023-04-27 PROCEDURE — 160025 RECOVERY II MINUTES (STATS): Performed by: ORTHOPAEDIC SURGERY

## 2023-04-27 PROCEDURE — 160046 HCHG PACU - 1ST 60 MINS PHASE II: Performed by: ORTHOPAEDIC SURGERY

## 2023-04-27 PROCEDURE — 700111 HCHG RX REV CODE 636 W/ 250 OVERRIDE (IP): Mod: UD | Performed by: ORTHOPAEDIC SURGERY

## 2023-04-27 PROCEDURE — 700101 HCHG RX REV CODE 250: Mod: UD | Performed by: ORTHOPAEDIC SURGERY

## 2023-04-27 PROCEDURE — 160039 HCHG SURGERY MINUTES - EA ADDL 1 MIN LEVEL 3: Performed by: ORTHOPAEDIC SURGERY

## 2023-04-27 PROCEDURE — 01810 ANES PX NRV MUSC F/ARM WRST: CPT | Performed by: STUDENT IN AN ORGANIZED HEALTH CARE EDUCATION/TRAINING PROGRAM

## 2023-04-27 PROCEDURE — 700102 HCHG RX REV CODE 250 W/ 637 OVERRIDE(OP): Mod: UD | Performed by: STUDENT IN AN ORGANIZED HEALTH CARE EDUCATION/TRAINING PROGRAM

## 2023-04-27 PROCEDURE — 160048 HCHG OR STATISTICAL LEVEL 1-5: Performed by: ORTHOPAEDIC SURGERY

## 2023-04-27 PROCEDURE — 700101 HCHG RX REV CODE 250: Mod: UD | Performed by: STUDENT IN AN ORGANIZED HEALTH CARE EDUCATION/TRAINING PROGRAM

## 2023-04-27 PROCEDURE — A9270 NON-COVERED ITEM OR SERVICE: HCPCS | Mod: UD | Performed by: STUDENT IN AN ORGANIZED HEALTH CARE EDUCATION/TRAINING PROGRAM

## 2023-04-27 PROCEDURE — 160035 HCHG PACU - 1ST 60 MINS PHASE I: Performed by: ORTHOPAEDIC SURGERY

## 2023-04-27 RX ORDER — DIPHENHYDRAMINE HYDROCHLORIDE 50 MG/ML
12.5 INJECTION INTRAMUSCULAR; INTRAVENOUS
Status: DISCONTINUED | OUTPATIENT
Start: 2023-04-27 | End: 2023-04-27 | Stop reason: HOSPADM

## 2023-04-27 RX ORDER — HYDRALAZINE HYDROCHLORIDE 20 MG/ML
5 INJECTION INTRAMUSCULAR; INTRAVENOUS
Status: DISCONTINUED | OUTPATIENT
Start: 2023-04-27 | End: 2023-04-27 | Stop reason: HOSPADM

## 2023-04-27 RX ORDER — EPHEDRINE SULFATE 50 MG/ML
5 INJECTION, SOLUTION INTRAVENOUS
Status: DISCONTINUED | OUTPATIENT
Start: 2023-04-27 | End: 2023-04-27 | Stop reason: HOSPADM

## 2023-04-27 RX ORDER — LIDOCAINE HYDROCHLORIDE 20 MG/ML
INJECTION, SOLUTION EPIDURAL; INFILTRATION; INTRACAUDAL; PERINEURAL PRN
Status: DISCONTINUED | OUTPATIENT
Start: 2023-04-27 | End: 2023-04-27 | Stop reason: SURG

## 2023-04-27 RX ORDER — SODIUM CHLORIDE, SODIUM LACTATE, POTASSIUM CHLORIDE, CALCIUM CHLORIDE 600; 310; 30; 20 MG/100ML; MG/100ML; MG/100ML; MG/100ML
INJECTION, SOLUTION INTRAVENOUS CONTINUOUS
Status: DISCONTINUED | OUTPATIENT
Start: 2023-04-27 | End: 2023-04-27 | Stop reason: HOSPADM

## 2023-04-27 RX ORDER — MIDAZOLAM HYDROCHLORIDE 1 MG/ML
INJECTION INTRAMUSCULAR; INTRAVENOUS PRN
Status: DISCONTINUED | OUTPATIENT
Start: 2023-04-27 | End: 2023-04-27 | Stop reason: SURG

## 2023-04-27 RX ORDER — OXYCODONE HCL 5 MG/5 ML
10 SOLUTION, ORAL ORAL
Status: COMPLETED | OUTPATIENT
Start: 2023-04-27 | End: 2023-04-27

## 2023-04-27 RX ORDER — MEPERIDINE HYDROCHLORIDE 25 MG/ML
6.25 INJECTION INTRAMUSCULAR; INTRAVENOUS; SUBCUTANEOUS
Status: DISCONTINUED | OUTPATIENT
Start: 2023-04-27 | End: 2023-04-27 | Stop reason: HOSPADM

## 2023-04-27 RX ORDER — ONDANSETRON 2 MG/ML
INJECTION INTRAMUSCULAR; INTRAVENOUS PRN
Status: DISCONTINUED | OUTPATIENT
Start: 2023-04-27 | End: 2023-04-27 | Stop reason: SURG

## 2023-04-27 RX ORDER — OXYCODONE HYDROCHLORIDE AND ACETAMINOPHEN 5; 325 MG/1; MG/1
1 TABLET ORAL EVERY 4 HOURS PRN
Qty: 15 TABLET | Refills: 0 | Status: SHIPPED | OUTPATIENT
Start: 2023-04-27 | End: 2023-05-02

## 2023-04-27 RX ORDER — CEFAZOLIN SODIUM 1 G/3ML
2 INJECTION, POWDER, FOR SOLUTION INTRAMUSCULAR; INTRAVENOUS ONCE
Status: COMPLETED | OUTPATIENT
Start: 2023-04-27 | End: 2023-04-27

## 2023-04-27 RX ORDER — BUPIVACAINE HYDROCHLORIDE 5 MG/ML
INJECTION, SOLUTION EPIDURAL; INTRACAUDAL
Status: DISCONTINUED | OUTPATIENT
Start: 2023-04-27 | End: 2023-04-27 | Stop reason: HOSPADM

## 2023-04-27 RX ORDER — OXYCODONE HCL 5 MG/5 ML
5 SOLUTION, ORAL ORAL
Status: COMPLETED | OUTPATIENT
Start: 2023-04-27 | End: 2023-04-27

## 2023-04-27 RX ORDER — DIPHENHYDRAMINE HYDROCHLORIDE 50 MG/ML
INJECTION INTRAMUSCULAR; INTRAVENOUS PRN
Status: DISCONTINUED | OUTPATIENT
Start: 2023-04-27 | End: 2023-04-27 | Stop reason: SURG

## 2023-04-27 RX ORDER — HALOPERIDOL 5 MG/ML
1 INJECTION INTRAMUSCULAR
Status: DISCONTINUED | OUTPATIENT
Start: 2023-04-27 | End: 2023-04-27 | Stop reason: HOSPADM

## 2023-04-27 RX ORDER — KETOROLAC TROMETHAMINE 30 MG/ML
INJECTION, SOLUTION INTRAMUSCULAR; INTRAVENOUS PRN
Status: DISCONTINUED | OUTPATIENT
Start: 2023-04-27 | End: 2023-04-27 | Stop reason: SURG

## 2023-04-27 RX ORDER — ACETAMINOPHEN 500 MG
1000 TABLET ORAL ONCE
Status: COMPLETED | OUTPATIENT
Start: 2023-04-27 | End: 2023-04-27

## 2023-04-27 RX ORDER — ONDANSETRON 2 MG/ML
4 INJECTION INTRAMUSCULAR; INTRAVENOUS
Status: DISCONTINUED | OUTPATIENT
Start: 2023-04-27 | End: 2023-04-27 | Stop reason: HOSPADM

## 2023-04-27 RX ORDER — SODIUM CHLORIDE, SODIUM LACTATE, POTASSIUM CHLORIDE, CALCIUM CHLORIDE 600; 310; 30; 20 MG/100ML; MG/100ML; MG/100ML; MG/100ML
INJECTION, SOLUTION INTRAVENOUS CONTINUOUS
Status: ACTIVE | OUTPATIENT
Start: 2023-04-27 | End: 2023-04-27

## 2023-04-27 RX ADMIN — DIPHENHYDRAMINE HYDROCHLORIDE 25 MG: 50 INJECTION, SOLUTION INTRAMUSCULAR; INTRAVENOUS at 10:00

## 2023-04-27 RX ADMIN — PROPOFOL 150 MCG/KG/MIN: 10 INJECTION, EMULSION INTRAVENOUS at 10:13

## 2023-04-27 RX ADMIN — PROPOFOL 50 MG: 10 INJECTION, EMULSION INTRAVENOUS at 10:14

## 2023-04-27 RX ADMIN — KETOROLAC TROMETHAMINE 15 MG: 30 INJECTION, SOLUTION INTRAMUSCULAR at 10:29

## 2023-04-27 RX ADMIN — PROPOFOL 50 MG: 10 INJECTION, EMULSION INTRAVENOUS at 10:22

## 2023-04-27 RX ADMIN — FENTANYL CITRATE 50 MCG: 50 INJECTION, SOLUTION INTRAMUSCULAR; INTRAVENOUS at 09:53

## 2023-04-27 RX ADMIN — CEFAZOLIN 2 G: 330 INJECTION, POWDER, FOR SOLUTION INTRAMUSCULAR; INTRAVENOUS at 10:14

## 2023-04-27 RX ADMIN — FENTANYL CITRATE 50 MCG: 50 INJECTION, SOLUTION INTRAMUSCULAR; INTRAVENOUS at 10:15

## 2023-04-27 RX ADMIN — ACETAMINOPHEN 1000 MG: 500 TABLET, FILM COATED ORAL at 07:33

## 2023-04-27 RX ADMIN — ONDANSETRON 4 MG: 2 INJECTION INTRAMUSCULAR; INTRAVENOUS at 10:29

## 2023-04-27 RX ADMIN — MIDAZOLAM HYDROCHLORIDE 2 MG: 1 INJECTION, SOLUTION INTRAMUSCULAR; INTRAVENOUS at 09:53

## 2023-04-27 RX ADMIN — LIDOCAINE HYDROCHLORIDE 100 MG: 20 INJECTION, SOLUTION EPIDURAL; INFILTRATION; INTRACAUDAL at 10:13

## 2023-04-27 RX ADMIN — SODIUM CHLORIDE, POTASSIUM CHLORIDE, SODIUM LACTATE AND CALCIUM CHLORIDE: 600; 310; 30; 20 INJECTION, SOLUTION INTRAVENOUS at 09:53

## 2023-04-27 RX ADMIN — OXYCODONE HYDROCHLORIDE 10 MG: 5 SOLUTION ORAL at 11:04

## 2023-04-27 RX ADMIN — VANCOMYCIN HYDROCHLORIDE 1500 MG: 500 INJECTION, POWDER, LYOPHILIZED, FOR SOLUTION INTRAVENOUS at 07:33

## 2023-04-27 RX ADMIN — PROPOFOL 50 MG: 10 INJECTION, EMULSION INTRAVENOUS at 10:26

## 2023-04-27 ASSESSMENT — PAIN SCALES - GENERAL: PAIN_LEVEL: 4

## 2023-04-27 ASSESSMENT — PAIN DESCRIPTION - PAIN TYPE
TYPE: SURGICAL PAIN
TYPE: SURGICAL PAIN

## 2023-04-27 NOTE — OP REPORT
DATE OF SERVICE:  04/27/2023     SERVICE:  Orthopedic Surgery.     PREOPERATIVE DIAGNOSIS:  Right long finger trigger finger.     POSTOPERATIVE DIAGNOSIS:  Right long finger trigger finger.     PROCEDURE:  Right long finger trigger finger release of A1 pulley.     SURGEON:  Cee Sheffield MD     ASSISTANT SURGEON:  None.     ANESTHESIA:  General.     ANESTHESIOLOGIST:  Brandyn Laura MD     COMPLICATIONS:  None.     ESTIMATED BLOOD LOSS:  2 mL.     TOURNIQUET TIME:  13 minutes.     DESCRIPTION OF PROCEDURE:  After informed consent was obtained, the patient   was brought to the operating room and given general anesthetic.  Her right   upper extremity was prepped and draped in the usual sterile fashion after   Ancef was given.  After the field was draped, a time-out was called correctly   identifying the patient and the procedure to be done.  The limb was then   exsanguinated and tourniquet was inflated to 250 mmHg.  We then made a   longitudinal incision in line with the palmar skin crease at the level of the   long finger.  We then carefully dissected down in a longitudinal plane to   avoid injury to the digital nerves down through the subcutaneous adipose   layer.  We dissected down to the A1 pulley.  The nodule was then palpated to   assure ourselves we were in the correct location.  We then carefully divided   the A1 pulley.  The wound was then irrigated with copious amounts of   irrigation.  The wound was then closed with 4-0 nylon suture.  We injected 2   mL of 0.5% Marcaine with no epinephrine into the wound site.  A wound dressing   was applied and the tourniquet was let down at 13 minutes.  No complications   were experienced.  Blood loss was 2 mL.        ______________________________  CEE SHEFFIELD MD    JHO/TRINIDAD/ALESSANDRO    DD:  04/27/2023 10:43  DT:  04/27/2023 11:21    Job#:  [id number]

## 2023-04-27 NOTE — DISCHARGE INSTRUCTIONS
If any questions arise, call your provider.  If your provider is not available, please feel free to call the [facility] at [phone].    MEDICATIONS: Resume taking daily medication.  Take prescribed pain medication with food.  If no medication is prescribed, you may take non-aspirin pain medication if needed.  PAIN MEDICATION CAN BE VERY CONSTIPATING.  Take a stool softener or laxative such as senokot, pericolace, or milk of magnesia if needed.    Last pain medication given at 11:04    What to Expect Post Anesthesia    Rest and take it easy for the first 24 hours.  A responsible adult is recommended to remain with you during that time.  It is normal to feel sleepy.  We encourage you to not do anything that requires balance, judgment or coordination.    FOR 24 HOURS DO NOT:  Drive, operate machinery or run household appliances.  Drink beer or alcoholic beverages.  Make important decisions or sign legal documents.    To avoid nausea, slowly advance diet as tolerated, avoiding spicy or greasy foods for the first day.  Add more substantial food to your diet according to your provider's instructions.  Babies can be fed formula or breast milk as soon as they are hungry.  INCREASE FLUIDS AND FIBER TO AVOID CONSTIPATION.    MILD FLU-LIKE SYMPTOMS ARE NORMAL.  YOU MAY EXPERIENCE GENERALIZED MUSCLE ACHES, THROAT IRRITATION, HEADACHE AND/OR SOME NAUSEA.    Diet    Resume your normal diet as tolerated.  A diet low in cholesterol, fat, and sodium is recommended for good health.     GENERAL ORTHO DISCHARGE INSTRUCTIONS:      Remove dressing in 3 days  Rtc romero 2 weeks        WOUND CARE:  You have a surgical wound that was closed with staples or sutures. These need to be removed 10-14 days after surgery.  If you are NOT in a splint or cast, the operative dressing should be removed 2 days after surgery, and dry gauze dressing changes should be performed daily after that.  You may shower when the operative dressing is  removed.    SPLINT/CAST CARE:  If present, you should keep your splint or cast clean, dry, and intact. For bathing/showering, wrap the splint in a double plastic bag with tape around the upper part of the extremity to keep it dry.  Be aware that some spotting of the dressing with blood can occur and is normal. You should elevate your operative extremity to minimize swelling in your fingers.     ICE: Apply ice packs to the affected area (15 minutes on the arm, 15 minutes off the arm) for the first week, as you feel necessary to help with the pain and swelling.    ELEVATION: Keep your extremity elevated as much as possible, above your heart, to help control pain and swelling for at least 2 weeks. If the sensation in your fingers of your operative extremity changes, or the fingers change colors, or should your pain become too difficult to control, you should immediately elevate your operative extremity.  If these symptoms do not resolve after 30 minutes to 1 hour, you should seek medical care immediately.    IMMOBILIZATION:  If you are wearing a sling or a brace, you may remove it when awake and seated, if desired.  You may move your fingers/hand as your splint allows. Please wear your sling or brace at all times otherwise, which includes sleeping.

## 2023-04-27 NOTE — OR NURSING
"Uneventful PACU stay. Patient states pain is 'minimal\". Medicated per orders with positive effect. Patient denies nausea, eating popsicle. Discharge per protocol to next level of care.  "

## 2023-04-27 NOTE — OR NURSING
Patient from pacu, aaox4, vss. Plan of care explained, discharge instructions to patient and . To car by wheelchair.

## 2023-04-27 NOTE — ANESTHESIA TIME REPORT
Anesthesia Start and Stop Event Times     Date Time Event    4/27/2023 0939 Ready for Procedure     0953 Anesthesia Start     1044 Anesthesia Stop        Responsible Staff  04/27/23    Name Role Begin End    Brandyn Laura M.D. Anesth 0953 1044        Overtime Reason:  no overtime (within assigned shift)    Comments:

## 2023-04-27 NOTE — ANESTHESIA PREPROCEDURE EVALUATION
Case: [id number] Date/Time: 04/27/23 0815    Procedure: RIGHT LONG FINGER TRIGGER FINGER RELEASE    Pre-op diagnosis: RIGHT LONG FINGER TRIGGER FINGER    Location: [facility] OR 04 / SURGERY [facility]    Surgeons: Keegan Garcia M.D.          Relevant Problems   NEURO   (positive) Seizure cerebral (HCC)       Physical Exam    Airway   Mallampati: II  TM distance: >3 FB  Neck ROM: full       Cardiovascular - normal exam  Rhythm: regular  Rate: normal  (-) murmur     Dental - normal exam           Pulmonary - normal exam  Breath sounds clear to auscultation     Abdominal    Neurological - normal exam                 Anesthesia Plan    ASA 2       Plan - MAC               Induction: intravenous    Postoperative Plan: Postoperative administration of opioids is intended.    Pertinent diagnostic labs and testing reviewed    Informed Consent:    Anesthetic plan and risks discussed with patient.

## 2023-04-27 NOTE — OR SURGEON
Immediate Post OP Note    PreOp Diagnosis: r long finger trigger finger      PostOp Diagnosis: same      Procedure(s):  RIGHT LONG FINGER TRIGGER FINGER RELEASE - Wound Class: Clean    Surgeon(s):  Keegan Garcia M.D.    Anesthesiologist/Type of Anesthesia:  Anesthesiologist: Brandyn Laura M.D./[facility]    Surgical Staff:  Circulator: Waqas George R.N.  Scrub Person: Waqas Douglas    Specimens removed if any:  * No specimens in log *    Estimated Blood Loss: 2cc    Findings: none    Complications: none        4/27/2023 10:40 AM Keegan Garcia M.D.

## 2023-04-27 NOTE — ANESTHESIA POSTPROCEDURE EVALUATION
Patient: Val Vaca    Procedure Summary     Date: 04/27/23 Room / Location: [facility] 04 / SURGERY [facility]    Anesthesia Start: 0953 Anesthesia Stop: 1044    Procedure: RIGHT LONG FINGER TRIGGER FINGER RELEASE (Right: Hand) Diagnosis: (RIGHT LONG FINGER TRIGGER FINGER)    Surgeons: Keegan Garcia M.D. Responsible Provider: Brandyn Laura M.D.    Anesthesia Type: MAC ASA Status: 2          Final Anesthesia Type: MAC  Last vitals  BP   Blood Pressure: 130/76    Temp   35.9 °C (96.6 °F)    Pulse   (!) 55   Resp   18    SpO2   99 %      Anesthesia Post Evaluation    Patient location during evaluation: PACU  Patient participation: complete - patient participated  Level of consciousness: awake and alert  Pain score: 4    Airway patency: patent  Anesthetic complications: no  Cardiovascular status: hemodynamically stable  Respiratory status: acceptable  Hydration status: euvolemic    PONV: none          No notable events documented.     Nurse Pain Score: 4 (NPRS)

## 2023-09-29 ENCOUNTER — HOSPITAL ENCOUNTER (OUTPATIENT)
Dept: RADIOLOGY | Facility: MEDICAL CENTER | Age: 38
End: 2023-09-29
Attending: FAMILY MEDICINE
Payer: COMMERCIAL

## 2023-09-29 DIAGNOSIS — R22.9 SUBCUTANEOUS NODULE: ICD-10-CM

## (undated) DEVICE — SHEARS MONOPOLAR CURVED  DA VINCI 10X'S REUSABLE

## (undated) DEVICE — COVER TIP ENDOWRIST HOT SHEAR - (10EA/BX) DA VINCI

## (undated) DEVICE — GLOVE BIOGEL PI INDICATOR SZ 6.5 SURGICAL PF LF - (50/BX 4BX/CA)

## (undated) DEVICE — SUTURE GENERAL

## (undated) DEVICE — SET EXTENSION WITH 2 PORTS (48EA/CA) ***PART #2C8610 IS A SUBSTITUTE*****

## (undated) DEVICE — TUBE CONNECT SUCTION CLEAR 120 X 1/4" (50EA/CA)"

## (undated) DEVICE — CHLORAPREP 26 ML APPLICATOR - ORANGE TINT(25/CA)

## (undated) DEVICE — SUTURE 2-0 20CM STRATAFIX SPIRAL SH NEEDLE (12/BX)

## (undated) DEVICE — SET LEADWIRE 5 LEAD BEDSIDE DISPOSABLE ECG (1SET OF 5/EA)

## (undated) DEVICE — NEEDLE INSFL 120MM 14GA VRRS - (20/BX)

## (undated) DEVICE — COVER LIGHT HANDLE ALC PLUS DISP (18EA/BX)

## (undated) DEVICE — GLOVE BIOGEL PI INDICATOR SZ 7.0 SURGICAL PF LF - (50/BX 4BX/CA)

## (undated) DEVICE — TUBE E-T HI-LO CUFF 7.0MM (10EA/PK)

## (undated) DEVICE — SENSOR OXIMETER ADULT SPO2 RD SET (20EA/BX)

## (undated) DEVICE — Device

## (undated) DEVICE — GLOVE BIOGEL SZ 6.5 SURGICAL PF LTX (50PR/BX 4BX/CA)

## (undated) DEVICE — ELECTRODE DUAL RETURN W/ CORD - (50/PK)

## (undated) DEVICE — ROBOTIC SURGERY SERVICES

## (undated) DEVICE — DERMABOND ADVANCED - (12EA/BX)

## (undated) DEVICE — CANISTER SUCTION 3000ML MECHANICAL FILTER AUTO SHUTOFF MEDI-VAC NONSTERILE LF DISP  (40EA/CA)

## (undated) DEVICE — SLEEVE VASO CALF MED - (10PR/CA)

## (undated) DEVICE — DRAPE ARM  BOX OF 20

## (undated) DEVICE — LACTATED RINGERS INJ 1000 ML - (14EA/CA 60CA/PF)

## (undated) DEVICE — TUBING CLEARLINK DUO-VENT - C-FLO (48EA/CA)

## (undated) DEVICE — PACK UPPER EXTREMITY (2EA/CA)

## (undated) DEVICE — BLADE SURGICAL #15 - (50/BX 3BX/CA)

## (undated) DEVICE — DRAPE COLUMN  BOX OF 20

## (undated) DEVICE — SUTURE 4-0 ETHILON FS-1 18 (36PK/BX)"

## (undated) DEVICE — GLOVE SZ 7 BIOGEL PI MICRO - PF LF (50PR/BX 4BX/CA)

## (undated) DEVICE — TOWELS CLOTH SURGICAL - (4/PK 20PK/CA)

## (undated) DEVICE — SLEEVE, VASO, THIGH, MED

## (undated) DEVICE — BIPOLAR FORCE DA VINCI 12X'S REISABLE

## (undated) DEVICE — BOVIE NEEDLE TIP INSULATD NON-SAFETY 2CM (50/PK)

## (undated) DEVICE — SODIUM CHL IRRIGATION 0.9% 1000ML (12EA/CA)

## (undated) DEVICE — NEEDLE DRIVER MEGA SUTURECUT DA VINCI 15X'S REUSABLE

## (undated) DEVICE — SUCTION INSTRUMENT YANKAUER BULBOUS TIP W/O VENT (50EA/CA)

## (undated) DEVICE — GLOVE 6.5 LF PF PROTEXIS (50PR/BX)

## (undated) DEVICE — TOWEL STOP TIMEOUT SAFETY FLAG (40EA/CA)

## (undated) DEVICE — TOURNIQUET CUFF 18 X 3 ONE PORT DISP - STERILE (10/BX)

## (undated) DEVICE — GOWN WARMING STANDARD FLEX - (30/CA)

## (undated) DEVICE — TUBE CONNECTING SUCTION - CLEAR PLASTIC STERILE 72 IN (50EA/CA)

## (undated) DEVICE — GLOVE BIOGEL PI ORTHO SZ 7.5 PF LF (40PR/BX)

## (undated) DEVICE — SUTURE 4-0 MONOCRYL PLUS PS-2 - 27 INCH (36/BX)

## (undated) DEVICE — GLOVE BIOGEL INDICATOR SZ 7SURGICAL PF LTX - (50/BX 4BX/CA)

## (undated) DEVICE — BANDAGE ELASTIC 4 HONEYCOMB - 4"X5YD LF (20/CA)"

## (undated) DEVICE — WATER IRRIGATION STERILE 1000ML (12EA/CA)

## (undated) DEVICE — STAPLER SKIN DISP - (6/BX 10BX/CA) VISISTAT

## (undated) DEVICE — SUTURE 0 VICRYL PLUS CT-2 - 27 INCH (36/BX)

## (undated) DEVICE — GLOVE BIOGEL SZ 8.5 SURGICAL PF LTX - (50PR/BX 4BX/CA)

## (undated) DEVICE — TUBING FILTER STRYKER

## (undated) DEVICE — SUTURE 0 STRATRFIX SYMMETRIC PDS PLUS 30CM CT-1 (12EA/BX)

## (undated) DEVICE — OBTURATOR BLADELESS STANDARD 8MM (6EA/BX)

## (undated) DEVICE — PADDING CAST 4 IN STERILE - 4 X 4 YDS (24/CA)

## (undated) DEVICE — SEAL 5MM-8MM UNIVERSAL  BOX OF 10

## (undated) DEVICE — GLOVE BIOGEL INDICATOR SZ 8.5 SURGICAL PF LTX - (50/BX 4BX/CA)